# Patient Record
Sex: MALE | Race: BLACK OR AFRICAN AMERICAN | URBAN - METROPOLITAN AREA
[De-identification: names, ages, dates, MRNs, and addresses within clinical notes are randomized per-mention and may not be internally consistent; named-entity substitution may affect disease eponyms.]

---

## 2018-02-07 ENCOUNTER — OFFICE VISIT (OUTPATIENT)
Dept: INTERNAL MEDICINE CLINIC | Age: 38
End: 2018-02-07

## 2018-02-07 VITALS
DIASTOLIC BLOOD PRESSURE: 75 MMHG | OXYGEN SATURATION: 98 % | HEIGHT: 76 IN | SYSTOLIC BLOOD PRESSURE: 113 MMHG | RESPIRATION RATE: 22 BRPM | HEART RATE: 94 BPM | WEIGHT: 217 LBS | TEMPERATURE: 97.7 F | BODY MASS INDEX: 26.42 KG/M2

## 2018-02-07 DIAGNOSIS — J02.9 SORE THROAT: ICD-10-CM

## 2018-02-07 DIAGNOSIS — J02.0 STREP THROAT: Primary | ICD-10-CM

## 2018-02-07 LAB
S PYO AG THROAT QL: POSITIVE
VALID INTERNAL CONTROL?: YES

## 2018-02-07 RX ORDER — AMOXICILLIN 500 MG/1
500 CAPSULE ORAL 2 TIMES DAILY
Qty: 20 CAP | Refills: 0 | Status: SHIPPED | OUTPATIENT
Start: 2018-02-07 | End: 2018-02-08 | Stop reason: ALTCHOICE

## 2018-02-07 NOTE — LETTER
NOTIFICATION RETURN TO WORK 
 
2/7/2018 8:39 AM 
 
Mr. Pamela Sosa [address] To Whom It May Concern: 
 
Pamela Sosa is currently under the care of Delisa Hebert. He will return to work on: 2/12/2018. If there are questions or concerns please have the patient contact our office. Sincerely, Mika Padron PA-C

## 2018-02-07 NOTE — MR AVS SNAPSHOT
[facility] 
 
 
 [address] [facility] [address] 
[phone] Patient: Nona Del Real MRN: [id number] NFS:4/93/5180 Visit Information Date & Time Provider Department Dept. Phone Encounter #  
 2/7/2018  7:45 AM Quoc Perkins [phone] [id number] Follow-up Instructions Return if symptoms worsen or fail to improve. Upcoming Health Maintenance Date Due DTaP/Tdap/Td series (1 - Tdap) 9/18/2001 Influenza Age 5 to Adult 8/1/2017 Allergies as of 2/7/2018  Review Complete On: 2/7/2018 By: Issa Collins PA-C No Known Allergies Current Immunizations  Never Reviewed No immunizations on file. Not reviewed this visit You Were Diagnosed With   
  
 Codes Comments Strep throat    -  Primary ICD-10-CM: J02.0 ICD-9-CM: 034.0 Sore throat     ICD-10-CM: J02.9 ICD-9-CM: 534 Vitals BP Pulse Temp Resp Height(growth percentile) Weight(growth percentile) 113/75 (BP 1 Location: Right arm, BP Patient Position: Sitting) 94 97.7 °F (36.5 °C) 22 6' 4\" (1.93 m) 217 lb (98.4 kg) SpO2 BMI 98% 26.41 kg/m2 BMI and BSA Data Body Mass Index Body Surface Area  
 26.41 kg/m 2 2.3 m 2 Your Updated Medication List  
  
   
This list is accurate as of: 2/7/18  8:39 AM.  Always use your most recent med list.  
  
  
  
  
 amoxicillin 500 mg capsule Commonly known as:  AMOXIL Take 1 Cap by mouth two (2) times a day for 10 days. Prescriptions Printed Refills  
 amoxicillin (AMOXIL) 500 mg capsule 0 Sig: Take 1 Cap by mouth two (2) times a day for 10 days. Class: Print Route: Oral  
  
We Performed the Following AMB POC RAPID STREP A [14897 CPT(R)] Follow-up Instructions Return if symptoms worsen or fail to improve. Patient Instructions Strep Throat: Care Instructions Your Care Instructions Strep throat is a bacterial infection that causes sudden, severe sore throat and fever. Strep throat, which is caused by bacteria called streptococcus, is treated with antibiotics. Sometimes a strep test is necessary to tell if the sore throat is caused by strep bacteria. Treatment can help ease symptoms and may prevent future problems. Follow-up care is a key part of your treatment and safety. Be sure to make and go to all appointments, and call your doctor if you are having problems. It's also a good idea to know your test results and keep a list of the medicines you take. How can you care for yourself at home? · Take your antibiotics as directed. Do not stop taking them just because you feel better. You need to take the full course of antibiotics. · Strep throat can spread to others until 24 hours after you begin taking antibiotics. During this time, you should avoid contact with other people at work or home, especially infants and children. Do not sneeze or cough on others, and wash your hands often. Keep your drinking glass and eating utensils separate from those of others, and wash these items well in hot, soapy water. · Gargle with warm salt water at least once each hour to help reduce swelling and make your throat feel better. Use 1 teaspoon of salt mixed in 8 fluid ounces of warm water. · Take an over-the-counter pain medication, such as acetaminophen (Tylenol), ibuprofen (Advil, Motrin), or naproxen (Aleve). Read and follow all instructions on the label. · Try an over-the-counter anesthetic throat spray or throat lozenges, which may help relieve throat pain. · Drink plenty of fluids. Fluids may help soothe an irritated throat. Hot fluids, such as tea or soup, may help your throat feel better. · Eat soft solids and drink plenty of clear liquids. Flavored ice pops, ice cream, scrambled eggs, sherbet, and gelatin dessert (such as Jell-O) may also soothe the throat. · Get lots of rest. 
· Do not smoke, and avoid secondhand smoke. If you need help quitting, talk to your doctor about stop-smoking programs and medicines. These can increase your chances of quitting for good. · Use a vaporizer or humidifier to add moisture to the air in your bedroom. Follow the directions for cleaning the machine. When should you call for help? Call your doctor now or seek immediate medical care if: 
? · You have a new or higher fever. ? · You have a fever with a stiff neck or severe headache. ? · You have new or worse trouble swallowing. ? · Your sore throat gets much worse on one side. ? · Your pain becomes much worse on one side of your throat. ? Watch closely for changes in your health, and be sure to contact your doctor if: 
? · You are not getting better after 2 days (48 hours). ? · You do not get better as expected. Where can you learn more? Go to http://felicity-krystal.info/. Enter K625 in the search box to learn more about \"Strep Throat: Care Instructions. \" Current as of: May 12, 2017 Content Version: 11.4 © 7846-2257 AppTap. Care instructions adapted under license by Walkbase (which disclaims liability or warranty for this information). If you have questions about a medical condition or this instruction, always ask your healthcare professional. [address] any warranty or liability for your use of this information. Check out Authority Nutrition's \"5 best foods to eat when sick\" on YouTube.  
https://youtu. be/9kTg20vaCSj Here are some other articles you might find helpful: 
https://ThreatMetrix.OBX Boatworks/15-best-foods-when-sick/ 
http://www. Short Fuze.com/content. aspx?cmtokqd=46053 
https://ThreatMetrix. OBX Boatworks/garlic-fights-colds-and-flu/ The best source of vitamin D is the sun.  Most light-skinned people should try to get 15-20 minutes of daily exposure to sunlight over as much skin as possible. Darker skinned people may need 2-4 times that much. Just don't overdo it--burning is bad. Dietary sources of vitamin D include cod liver oil, salmon, tuna, sardines, beef liver, pork, and egg yolks; as well as dairy and other fortified foods. Also consider a dietary supplement of 5785-4308 international units of vitamin D daily. You may also want to consider increased intake of vitamin K2. 
https://Tuenti Technologies. Noninvasive Medical Technologies/vitamin-k2/ Here are some good food sources for vitamin C:  
Vegetables: bell peppers, kale, tomatoes, broccoli. Fruit: black cherries, oranges, grapefruit, peaches. Whole fruit is preferred over juice, especially for immunity, because whole fruit retains its natural fiber. Sugar from whole fruit is absorbed much more slowly than in juice, and too much sugar can suppress the immune system and increase inflammation. For more information: 
CoverItLiveCoForte Design Systems.co.nz 
http://Tuenti Technologies.Noninvasive Medical Technologies/fruit-juice-is-just-as-bad-as-soda/ 
Check out this video from Dr Mardeen Cushing on Zinc deficiency: 
Home Comfort Zones.br I recommend increased pre-biotic/probiotic for duration of antibiotic therapy. Check out this video from Dr Vaughn Armas on what to do if you have to take antibiotics:  
Https://youtu. be/jbwaQaKli5T Or go to You Tube and search \"maddi iasac antibiotics\" For the full article, go to [employer] 
http://OPS USA.Noninvasive Medical Technologies/blog/2017/01/25/heres-downside-antibiotics-doctor-might-not-tell/ 
 
Here's another good one from Selex[employer] Therapeuticsicare:  
https://Cardio3 BioSciences/what-to-do-if-you-need-to-take-antibiotics/ 
 
A healthy gut contains many species of bacteria and yeast that can help with the breakdown of starches, and improve the function of the immune system (probiotics). Good gut kathryn also help with the absorption of nutrients such as vitamins A,D,E, and K, as well as calcium, iron, and chromium. Probiotic foods or supplements help to add microorganisms to the stomach and intestines. These foods include fermented foods such as yogurt, sauerkraut, kefir, kimchi, natto, and miso. Prebiotics are foods that help feed the existing microorganisms in the gut. These include bananas, artichokes, leeks, garlic, and onions. http://authorityGrocery Shopping Network. com/probiotics-101/ 
https://authorityGrocery Shopping Network.com/19-best-prebiotic-foods/ 
https://Xsens Technologies. Synthetic Genomics/8-health-benefits-of-probiotics/ 
https://authorityGrocery Shopping Network.com/best-probiotic-supplement/ They may also aid weight loss:  
https://youtu. be/1CJKjWlrDug (from the [place] [address]) Check out this TRESSA talk: How the food you eat affects your gut - Curtis Carlin Https://youtu. be/1sISguPDlhY \"4 Surprising Things That Are Bad for Your Gut Bacteria\" (on the Splitforce Nutrition YouTube channel) 
https://youtu. be/y7k0wrAXPsa Introducing [facility]! Ludmila Pedraza introduces National Payment Network patient portal. Now you can access parts of your medical record, email your doctor's office, and request medication refills online. 1. In your internet browser, go to https://StartDate Labs. Xrispi Labs Ltd./Pattern Genomicshart 2. Click on the First Time User? Click Here link in the Sign In box. You will see the New Member Sign Up page. 3. Enter your National Payment Network Access Code exactly as it appears below. You will not need to use this code after youve completed the sign-up process. If you do not sign up before the expiration date, you must request a new code. · National Payment Network Access Code: [email or web address] Expires: 5/8/2018  8:39 AM 
 
4. Enter the last four digits of your Social Security Number (xxxx) and Date of Birth (mm/dd/yyyy) as indicated and click Submit. You will be taken to the next sign-up page. 5. Create a National Payment Network ID. This will be your National Payment Network login ID and cannot be changed, so think of one that is secure and easy to remember. 6. Create a iTwin password. You can change your password at any time. 7. Enter your Password Reset Question and Answer. This can be used at a later time if you forget your password. 8. Enter your e-mail address. You will receive e-mail notification when new information is available in [address] 9. Click Sign Up. You can now view and download portions of your medical record. 10. Click the Download Summary menu link to download a portable copy of your medical information. If you have questions, please visit the Frequently Asked Questions section of the iTwin website. Remember, iTwin is NOT to be used for urgent needs. For medical emergencies, dial 911. Now available from your iPhone and Android! Please provide this summary of care documentation to your next provider. Your primary care clinician is listed as Cassidy Souza. If you have any questions after today's visit, please call [phone].

## 2018-02-07 NOTE — PATIENT INSTRUCTIONS
Strep Throat: Care Instructions  Your Care Instructions    Strep throat is a bacterial infection that causes sudden, severe sore throat and fever. Strep throat, which is caused by bacteria called streptococcus, is treated with antibiotics. Sometimes a strep test is necessary to tell if the sore throat is caused by strep bacteria. Treatment can help ease symptoms and may prevent future problems. Follow-up care is a key part of your treatment and safety. Be sure to make and go to all appointments, and call your doctor if you are having problems. It's also a good idea to know your test results and keep a list of the medicines you take. How can you care for yourself at home? · Take your antibiotics as directed. Do not stop taking them just because you feel better. You need to take the full course of antibiotics. · Strep throat can spread to others until 24 hours after you begin taking antibiotics. During this time, you should avoid contact with other people at work or home, especially infants and children. Do not sneeze or cough on others, and wash your hands often. Keep your drinking glass and eating utensils separate from those of others, and wash these items well in hot, soapy water. · Gargle with warm salt water at least once each hour to help reduce swelling and make your throat feel better. Use 1 teaspoon of salt mixed in 8 fluid ounces of warm water. · Take an over-the-counter pain medication, such as acetaminophen (Tylenol), ibuprofen (Advil, Motrin), or naproxen (Aleve). Read and follow all instructions on the label. · Try an over-the-counter anesthetic throat spray or throat lozenges, which may help relieve throat pain. · Drink plenty of fluids. Fluids may help soothe an irritated throat. Hot fluids, such as tea or soup, may help your throat feel better. · Eat soft solids and drink plenty of clear liquids.  Flavored ice pops, ice cream, scrambled eggs, sherbet, and gelatin dessert (such as Jell-O) may also soothe the throat. · Get lots of rest.  · Do not smoke, and avoid secondhand smoke. If you need help quitting, talk to your doctor about stop-smoking programs and medicines. These can increase your chances of quitting for good. · Use a vaporizer or humidifier to add moisture to the air in your bedroom. Follow the directions for cleaning the machine. When should you call for help? Call your doctor now or seek immediate medical care if:  ? · You have a new or higher fever. ? · You have a fever with a stiff neck or severe headache. ? · You have new or worse trouble swallowing. ? · Your sore throat gets much worse on one side. ? · Your pain becomes much worse on one side of your throat. ? Watch closely for changes in your health, and be sure to contact your doctor if:  ? · You are not getting better after 2 days (48 hours). ? · You do not get better as expected. Where can you learn more? Go to http://felicity-krystal.info/. Enter K625 in the search box to learn more about \"Strep Throat: Care Instructions. \"  Current as of: May 12, 2017  Content Version: 11.4  © 1860-1962 Blueheath Holdings. Care instructions adapted under license by Mobile Bridge (which disclaims liability or warranty for this information). If you have questions about a medical condition or this instruction, always ask your healthcare professional. [address] any warranty or liability for your use of this information. Check out Authority Nutrition's \"5 best foods to eat when sick\" on YouTube.   https://youtu. be/2yNu26oqWSp    Here are some other articles you might find helpful:  https://ICB International.Suda/15-best-foods-when-sick/  http://www. DebtLESS Community.com/content. aspx?giznexw=48799  https://ICB International. Suda/garlic-fights-colds-and-flu/    The best source of vitamin D is the sun.  Most light-skinned people should try to get 15-20 minutes of daily exposure to sunlight over as much skin as possible. Darker skinned people may need 2-4 times that much. Just don't overdo it--burning is bad. Dietary sources of vitamin D include cod liver oil, salmon, tuna, sardines, beef liver, pork, and egg yolks; as well as dairy and other fortified foods. Also consider a dietary supplement of 7870-0470 international units of vitamin D daily. You may also want to consider increased intake of vitamin K2.  https://Odersun. Cooper's Classics/vitamin-k2/  Here are some good food sources for vitamin C:   Vegetables: bell peppers, kale, tomatoes, broccoli. Fruit: black cherries, oranges, grapefruit, peaches. Whole fruit is preferred over juice, especially for immunity, because whole fruit retains its natural fiber. Sugar from whole fruit is absorbed much more slowly than in juice, and too much sugar can suppress the immune system and increase inflammation. For more information:  idio.co.nz  http://Odersun.Cooper's Classics/fruit-juice-is-just-as-bad-as-soda/  Check out this video from Dr Shai Quintanilla on Zinc deficiency:  Chrono24.com.br    I recommend increased pre-biotic/probiotic for duration of antibiotic therapy. Check out this video from Dr Yunior Schmitt on what to do if you have to take antibiotics:   Https://Reflexion Healthu. be/nfhySlPxk5D  Or go to You Tube and search \"maddi isaac antibiotics\"  For the full article, go to [employer]  http://Tradersmail.com.Cooper's Classics/blog/2017/01/25/heres-downside-antibiotics-doctor-might-not-tell/    Here's another good one from [employer]:   https://Banro Corporation/what-to-do-if-you-need-to-take-antibiotics/    A healthy gut contains many species of bacteria and yeast that can help with the breakdown of starches, and improve the function of the immune system (probiotics).  Good gut kathryn also help with the absorption of nutrients such as vitamins A,D,E, and K, as well as calcium, iron, and chromium. Probiotic foods or supplements help to add microorganisms to the stomach and intestines. These foods include fermented foods such as yogurt, sauerkraut, kefir, kimchi, natto, and miso. Prebiotics are foods that help feed the existing microorganisms in the gut. These include bananas, artichokes, leeks, garlic, and onions. http://DIIME. Advent Solar/probiotics-101/  https://DIIME.Advent Solar/19-best-prebiotic-foods/  https://DIIME. Advent Solar/8-health-benefits-of-probiotics/  https://DIIME.Advent Solar/best-probiotic-supplement/    They may also aid weight loss:   https://youtu. be/1CJKjWlrDug (from the [facility])    Check out this TRESSA talk: How the food you eat affects your gut - Esther Ceja  Https://youtu. be/1sISguPDlhY    \"4 Surprising Things That Are Bad for Your Gut Bacteria\"  (on the 3FLOZ Nutrition YouTube channel)  https://youtu. be/c8x7dcVHCii

## 2018-02-07 NOTE — PROGRESS NOTES
HISTORY OF PRESENT ILLNESS  Debbie Rose is a 40 y.o. male. HPI Comments: Pt presents with cold symptoms that have persisted for a month, worsening over the past 2 weeks; especially over the past three days. C/o fever, chills, sore throat with painful swallowing, left ear pain, back pain. Also c/o vomiting. States he measured a 100.3 fever on Sunday. Denies cough. States his son is also sick, and got sick first. States his son mainly has cough and ear pain with fever. Sore Throat    Associated symptoms include vomiting, congestion and ear pain (left). Pertinent negatives include no diarrhea, no headaches, no shortness of breath and no cough. Generalized Body Aches   Pertinent negatives include no chest pain, no abdominal pain, no headaches and no shortness of breath. Chills    Associated symptoms include vomiting, congestion and sore throat. Pertinent negatives include no chest pain, no diarrhea, no headaches, no cough, no shortness of breath and no rash. Back Pain    Associated symptoms include a fever and weakness. Pertinent negatives include no chest pain, no headaches and no abdominal pain. Review of Systems   Constitutional: Positive for chills, fever and malaise/fatigue. HENT: Positive for congestion, ear pain (left) and sore throat. Respiratory: Negative for cough and shortness of breath. Cardiovascular: Negative for chest pain and palpitations. Gastrointestinal: Positive for nausea and vomiting. Negative for abdominal pain and diarrhea. Musculoskeletal: Positive for back pain and myalgias (intermittent diffuse body aches). Skin: Negative for rash. Neurological: Positive for weakness. Negative for dizziness and headaches.      Visit Vitals    /75 (BP 1 Location: Right arm, BP Patient Position: Sitting)    Pulse 94    Temp 97.7 °F (36.5 °C)    Resp 22    Ht 6' 4\" (1.93 m)    Wt 217 lb (98.4 kg)    SpO2 98%    BMI 26.41 kg/m2       Physical Exam Constitutional: He is oriented to person, place, and time. Vital signs are normal. He appears well-developed and well-nourished. He is cooperative. He does not appear ill. No distress. HENT:   Head: Normocephalic and atraumatic. Right Ear: Tympanic membrane, external ear and ear canal normal.   Left Ear: Tympanic membrane, external ear and ear canal normal.   Nose: Rhinorrhea present. No mucosal edema. Mouth/Throat: Uvula is midline and mucous membranes are normal. Oropharyngeal exudate, posterior oropharyngeal edema and posterior oropharyngeal erythema present. Eyes: Conjunctivae are normal.   Neck: Neck supple. Cardiovascular: Normal rate, regular rhythm and normal heart sounds. Exam reveals no gallop and no friction rub. No murmur heard. Pulses:       Radial pulses are 2+ on the right side, and 2+ on the left side. Pulmonary/Chest: Effort normal and breath sounds normal. No respiratory distress. He has no decreased breath sounds. He has no wheezes. He has no rhonchi. He has no rales. Lymphadenopathy:        Head (right side): Tonsillar adenopathy present. Head (left side): Tonsillar adenopathy present. He has cervical adenopathy. Right cervical: Superficial cervical adenopathy present. Left cervical: Superficial cervical adenopathy present. Neurological: He is alert and oriented to person, place, and time. Skin: Skin is warm and dry. He is not diaphoretic. Psychiatric: He has a normal mood and affect. His speech is normal and behavior is normal. Thought content normal.   Nursing note and vitals reviewed.     (+) tonsillar exudates, (+) tender anterior cervical adenopathy, (+) fever history, (+) absence of cough    Results for orders placed or performed in visit on 02/07/18   AMB POC RAPID STREP A   Result Value Ref Range    VALID INTERNAL CONTROL POC Yes     Group A Strep Ag Positive Negative       ASSESSMENT and PLAN    ICD-10-CM ICD-9-CM    1. Strep throat J02.0 034.0 amoxicillin (AMOXIL) 500 mg capsule   2. Sore throat J02.9 462 AMB POC RAPID STREP A     Provided after-visit information on strep throat. Reviewed reasons to return or seek emergency care. Pt verbalized understanding and agreement with the plan of care.     Qiana Buck PA-C